# Patient Record
Sex: FEMALE | Race: WHITE | URBAN - METROPOLITAN AREA
[De-identification: names, ages, dates, MRNs, and addresses within clinical notes are randomized per-mention and may not be internally consistent; named-entity substitution may affect disease eponyms.]

---

## 2017-03-09 ENCOUNTER — OFFICE VISIT (OUTPATIENT)
Dept: URGENT CARE | Facility: URGENT CARE | Age: 37
End: 2017-03-09
Payer: COMMERCIAL

## 2017-03-09 VITALS
BODY MASS INDEX: 41.04 KG/M2 | OXYGEN SATURATION: 98 % | HEART RATE: 120 BPM | WEIGHT: 262 LBS | DIASTOLIC BLOOD PRESSURE: 86 MMHG | SYSTOLIC BLOOD PRESSURE: 118 MMHG | TEMPERATURE: 98.5 F

## 2017-03-09 DIAGNOSIS — S61.011A LACERATION OF THUMB, RIGHT, INITIAL ENCOUNTER: Primary | ICD-10-CM

## 2017-03-09 PROCEDURE — 99213 OFFICE O/P EST LOW 20 MIN: CPT | Performed by: FAMILY MEDICINE

## 2017-03-09 NOTE — NURSING NOTE
"Chief Complaint   Patient presents with     Urgent Care     Laceration     Cutting and sliced right thumb last night at 8pm.        Initial /86  Pulse 120  Temp 98.5  F (36.9  C) (Oral)  Wt 262 lb (118.8 kg)  SpO2 98%  BMI 41.04 kg/m2 Estimated body mass index is 41.04 kg/(m^2) as calculated from the following:    Height as of 9/15/16: 5' 7\" (1.702 m).    Weight as of this encounter: 262 lb (118.8 kg).  Medication Reconciliation: roberta Devine   "

## 2017-03-09 NOTE — PROGRESS NOTES
SUBJECTIVE:     Chief Complaint   Patient presents with     Urgent Care     Laceration     Cutting and sliced right thumb last night at 8pm.      Natalia Flower is a 36 year old female who presents to the clinic with a laceration on the right thumb sustained over 12 hours ago.  This is a non-work related and accidental injury.  Sliced tip off, bleeding for couple of hours but did stop.  Has moderate pain.   Mechanism of injury: mandolin.    Associated symptoms: Denies numbness, weakness, or loss of function  Last tetanus booster within 10 years: yes    EXAM:   The patient appears today in alert,no apparent distress distress  VITALS: /86  Pulse 120  Temp 98.5  F (36.9  C) (Oral)  Wt 262 lb (118.8 kg)  SpO2 98%  BMI 41.04 kg/m2    Size of laceration: 2 centimeters oval shaped  Characteristics of the laceration: clean and tip shaved off  Tendon function intact: yes  Sensation to light touch intact: yes  Pulses intact: yes  Picture included in patient's chart: no    Assessment:  Laceration of thumb, right, initial encounter    PLAN:  PROCEDURE NOTE::  Wound cleaned with betadine/saline solution  Wound cleaned with Regan-Zander  Topical antibiotic applied    After care instructions:  Keep wound clean and dry for the next 24-48 hours  Signs of infection discussed today  Apply anti-bacterial ointment for 5-10 days   Discussed the probability of scarring  Use non-sticky telfa to wound    Marciano Avina MD  March 9, 2017 10:49 AM

## 2017-03-09 NOTE — PATIENT INSTRUCTIONS
Finger Tip Amputation (Open Treatment)  You have cut the tip of your finger partially or completely off. For this type of injury, it is best to allow the wound to heal on its own by growing new skin from the sides. Depending on the size of the wound, it will take from 2-6 weeks for the wound to fill in with new skin. Once healed, you should regain most feeling in the new skin.   Home care  The following guidelines will help you care for your wound at home:    If a numbing medicine was used on your finger, it will usually wear off in 1-6 hours. Then, take any pain medicine as prescribed. If none was prescribed, you can take an over-the-counter pain reliever.    Keep the injured hand elevated during the first two days to reduce swelling and pain.    Keep the bandage clean and dry. If the dressing stays on longer than two days, it will stick to the wound. Unless, you have a doctor appointment in two days, change the bandage as described below.    If you were given medicines to prevent infection, take them as directed until they are gone or you are told to stop.    If the dressing sticks to the wound, loosen it by soaking the dressing under warm running water.    After removing the dressing, clean the wound with soap and water. Then apply an antibiotic ointment.     Use extra gauze dressing for the first two days to protect the wound from further injury. Cover with a nonstick gauze pad or wrap with bandage gauze. After that, if your wound is small and not too painful, a large stretch bandage is okay to use.    You may shower as usual, but keep the dressing dry by using a small plastic bag over the hand, rubber-banded at the wrist. Do not soak your hand in water (no baths or swimming) until your healthcare provider says it's OK.  Follow-up care  Follow up with your healthcare provider as advised.  When to seek medical advice  Call your health care provider right away if any of these occur:    Bleeding not controlled by  direct pressure    Increasing pain in the wound    Redness or swelling around the wound    Pus or fluid coming from the wound or foul odor     Fever of 100.4 F (38 C) or higher, or as directed by your healthcare provider    9220-0466 [employer]. [address] [place] [address]. All rights reserved. This information is not intended as a substitute for professional medical care. Always follow your healthcare professional's instructions.

## 2017-03-09 NOTE — MR AVS SNAPSHOT
After Visit Summary   3/9/2017    Natalia Flower    MRN: [id number]           Patient Information     Date Of Birth          1980        Visit Information        Provider Department      3/9/2017 9:00 AM Marciano Avina MD [facility]        Today's Diagnoses     Laceration of thumb, right, initial encounter    -  1      Care Instructions      Finger Tip Amputation (Open Treatment)  You have cut the tip of your finger partially or completely off. For this type of injury, it is best to allow the wound to heal on its own by growing new skin from the sides. Depending on the size of the wound, it will take from 2-6 weeks for the wound to fill in with new skin. Once healed, you should regain most feeling in the new skin.   Home care  The following guidelines will help you care for your wound at home:    If a numbing medicine was used on your finger, it will usually wear off in 1-6 hours. Then, take any pain medicine as prescribed. If none was prescribed, you can take an over-the-counter pain reliever.    Keep the injured hand elevated during the first two days to reduce swelling and pain.    Keep the bandage clean and dry. If the dressing stays on longer than two days, it will stick to the wound. Unless, you have a doctor appointment in two days, change the bandage as described below.    If you were given medicines to prevent infection, take them as directed until they are gone or you are told to stop.    If the dressing sticks to the wound, loosen it by soaking the dressing under warm running water.    After removing the dressing, clean the wound with soap and water. Then apply an antibiotic ointment.     Use extra gauze dressing for the first two days to protect the wound from further injury. Cover with a nonstick gauze pad or wrap with bandage gauze. After that, if your wound is small and not too painful, a large stretch bandage is okay to use.    You may shower as usual, but keep  "the dressing dry by using a small plastic bag over the hand, rubber-banded at the wrist. Do not soak your hand in water (no baths or swimming) until your healthcare provider says it's OK.  Follow-up care  Follow up with your healthcare provider as advised.  When to seek medical advice  Call your health care provider right away if any of these occur:    Bleeding not controlled by direct pressure    Increasing pain in the wound    Redness or swelling around the wound    Pus or fluid coming from the wound or foul odor     Fever of 100.4 F (38 C) or higher, or as directed by your healthcare provider    5494-8783 [employer]. [address] [place] [address]. All rights reserved. This information is not intended as a substitute for professional medical care. Always follow your healthcare professional's instructions.              Follow-ups after your visit        Who to contact     If you have questions or need follow up information about today's clinic visit or your schedule please contact [facility] directly at [phone].  Normal or non-critical lab and imaging results will be communicated to you by [employer], letter or phone within 4 business days after the clinic has received the results. If you do not hear from us within 7 days, please contact the clinic through [employer] or phone. If you have a critical or abnormal lab result, we will notify you by phone as soon as possible.  Submit refill requests through [employer] or call your pharmacy and they will forward the refill request to us. Please allow 3 business days for your refill to be completed.          Additional Information About Your Visit        [employer] Information     [employer] lets you send messages to your doctor, view your test results, renew your prescriptions, schedule appointments and more. To sign up, go to [email or web address] . Click on \"Log in\" on the left side of the screen, which will take you to the Welcome " "page. Then click on \"Sign up Now\" on the right side of the page.     You will be asked to enter the access code listed below, as well as some personal information. Please follow the directions to create your username and password.     Your access code is: [email or web address]  Expires: 2017  9:26 AM     Your access code will  in 90 days. If you need help or a new code, please call your [facility] clinic or [phone].        Care EveryWhere ID     This is your Care EveryWhere ID. This could be used by other organizations to access your [facility] medical records  [id number]        Your Vitals Were     Pulse Temperature Pulse Oximetry BMI (Body Mass Index)          120 98.5  F (36.9  C) (Oral) 98% 41.04 kg/m2         Blood Pressure from Last 3 Encounters:   17 118/86   09/15/16 110/80   16 108/82    Weight from Last 3 Encounters:   17 262 lb (118.8 kg)   09/15/16 263 lb 6.4 oz (119.5 kg)   16 268 lb (121.6 kg)              Today, you had the following     No orders found for display       Primary Care Provider Office Phone # Fax #    Rody Welch [phone]       [facility] [place] [address] [place] [address]        Thank you!     Thank you for choosing [facility]  for your care. Our goal is always to provide you with excellent care. Hearing back from our patients is one way we can continue to improve our services. Please take a few minutes to complete the written survey that you may receive in the mail after your visit with us. Thank you!             Your Updated Medication List - Protect others around you: Learn how to safely use, store and throw away your medicines at www.disposemymeds.org.          This list is accurate as of: 3/9/17  9:26 AM.  Always use your most recent med list.                   Brand Name Dispense Instructions for use    ARIPiprazole 5 MG tablet    ABILIFY     Reported on 3/9/2017       FLUoxetine 20 MG " capsule    PROzac         levonorgestrel 20 MCG/24HR IUD    MIRENA    1 each    1 each (20 mcg) by Intrauterine route once       lidocaine visc 2% 2.5mL/5mL & maalox/mylanta w/ simeth 2.5mL/5mL & diphenhydrAMINE 5mg/5mL Susp suspension    MAGIC Mouthwash    120 mL    Swish and swallow 10 mLs in mouth every 6 hours as needed for mouth sores       propranolol 10 MG tablet    INDERAL         SEROQUEL PO      Take 200 mg by mouth       XANAX PO      Take 0.5 mg by mouth as needed for anxiety

## 2017-07-01 ENCOUNTER — HEALTH MAINTENANCE LETTER (OUTPATIENT)
Age: 37
End: 2017-07-01